# Patient Record
Sex: MALE | Race: WHITE | NOT HISPANIC OR LATINO
[De-identification: names, ages, dates, MRNs, and addresses within clinical notes are randomized per-mention and may not be internally consistent; named-entity substitution may affect disease eponyms.]

---

## 2022-01-25 ENCOUNTER — NON-APPOINTMENT (OUTPATIENT)
Age: 37
End: 2022-01-25

## 2022-01-25 PROBLEM — Z00.00 ENCOUNTER FOR PREVENTIVE HEALTH EXAMINATION: Status: ACTIVE | Noted: 2022-01-25

## 2022-01-27 ENCOUNTER — NON-APPOINTMENT (OUTPATIENT)
Age: 37
End: 2022-01-27

## 2022-01-27 ENCOUNTER — APPOINTMENT (OUTPATIENT)
Dept: UROLOGY | Facility: CLINIC | Age: 37
End: 2022-01-27
Payer: COMMERCIAL

## 2022-01-27 VITALS
HEIGHT: 66 IN | BODY MASS INDEX: 31.82 KG/M2 | WEIGHT: 198 LBS | DIASTOLIC BLOOD PRESSURE: 88 MMHG | SYSTOLIC BLOOD PRESSURE: 136 MMHG | HEART RATE: 76 BPM

## 2022-01-27 DIAGNOSIS — Z82.49 FAMILY HISTORY OF ISCHEMIC HEART DISEASE AND OTHER DISEASES OF THE CIRCULATORY SYSTEM: ICD-10-CM

## 2022-01-27 DIAGNOSIS — Z78.9 OTHER SPECIFIED HEALTH STATUS: ICD-10-CM

## 2022-01-27 DIAGNOSIS — Z87.898 PERSONAL HISTORY OF OTHER SPECIFIED CONDITIONS: ICD-10-CM

## 2022-01-27 DIAGNOSIS — N48.89 OTHER SPECIFIED DISORDERS OF PENIS: ICD-10-CM

## 2022-01-27 DIAGNOSIS — Z83.3 FAMILY HISTORY OF DIABETES MELLITUS: ICD-10-CM

## 2022-01-27 DIAGNOSIS — Z80.43 FAMILY HISTORY OF MALIGNANT NEOPLASM OF TESTIS: ICD-10-CM

## 2022-01-27 DIAGNOSIS — Z80.3 FAMILY HISTORY OF MALIGNANT NEOPLASM OF BREAST: ICD-10-CM

## 2022-01-27 PROCEDURE — 99204 OFFICE O/P NEW MOD 45 MIN: CPT

## 2022-01-27 RX ORDER — CHROMIUM 200 MCG
TABLET ORAL
Refills: 0 | Status: ACTIVE | COMMUNITY

## 2022-01-27 RX ORDER — ACETAMINOPHEN 160 MG/5ML
500 SUSPENSION ORAL
Refills: 0 | Status: ACTIVE | COMMUNITY

## 2022-01-27 RX ORDER — ZINC SULFATE 50(220)MG
CAPSULE ORAL
Refills: 0 | Status: ACTIVE | COMMUNITY

## 2022-01-27 RX ORDER — MULTIVITAMIN
TABLET ORAL
Refills: 0 | Status: ACTIVE | COMMUNITY

## 2022-01-27 NOTE — PHYSICAL EXAM
[General Appearance - Well Developed] : well developed [General Appearance - Well Nourished] : well nourished [Normal Appearance] : normal appearance [Well Groomed] : well groomed [General Appearance - In No Acute Distress] : no acute distress [Heart Rate And Rhythm] : Heart rate and rhythm were normal [Edema] : no peripheral edema [Respiration, Rhythm And Depth] : normal respiratory rhythm and effort [Exaggerated Use Of Accessory Muscles For Inspiration] : no accessory muscle use [Auscultation Breath Sounds / Voice Sounds] : lungs were clear to auscultation bilaterally [Abdomen Soft] : soft [Abdomen Tenderness] : non-tender [Abdomen Hernia] : no hernia was discovered [Costovertebral Angle Tenderness] : no ~M costovertebral angle tenderness [Penis Abnormality] : normal circumcised penis [Epididymis] : the epididymides were normal [Testes Tenderness] : no tenderness of the testes [Testes Mass (___cm)] : there were no testicular masses [Normal Station and Gait] : the gait and station were normal for the patient's age [] : no rash [Oriented To Time, Place, And Person] : oriented to person, place, and time [Affect] : the affect was normal [Mood] : the mood was normal [Not Anxious] : not anxious [Inguinal Lymph Nodes Enlarged Bilaterally] : inguinal [FreeTextEntry1] : Deep tendon reflexes were normal

## 2022-01-27 NOTE — LETTER HEADER
[FreeTextEntry3] : Berto Bui M.D.\par Director Emeritus of Urology\par Director of Male Infertility\par [facility]\par [address] [place] [address], [facility] [address]\[place] [address]

## 2022-01-27 NOTE — HISTORY OF PRESENT ILLNESS
[3] : 3 [Dull] : dull [Gradual] : gradual [___ Day(s) Ago] : [unfilled] day(s) ago [Constant] : constantly [Moderate] : moderate in severity [Improving] : improving [None] : No relieving factors are noted [FreeTextEntry1] : Joshua is a 36-year-old male born March 2, 1985 who has been with his wife Betty for 4 years.  They have a 2-year-old and lately they have been trying for another.  However his wife possibly postpartum possibly other issues really only wants relations on the day that she is ovulating she has not yet conceived in [facility] and they are questioning why.  Joshua has noticed that the sperm when he does ejaculate is a little thicker than it used to be and the force of ejaculation is decreased.  He has had no major medications just various vitamins.  He tends to try and work out that with Covid its been a little difficult.  About 3 to 4 days ago he woke up and at the base of the penis where it meets the pubic bone he has been having some pain.  Is not sexually related as they did not have sex in the time Leading to the pain and he cannot recall any trauma.  The area is tender to the touch and when he gets an erection it bothers him more.  It is gotten better in the last day or so but still there and he is concerned as to what it was, what it is and what caused it.  He would also like to know if there is anything other than more frequent sex that might help them conceive their second child.  [de-identified] : Base of the penis [de-identified] : Touch show anything that puts the penis on tension such as an erection

## 2022-01-27 NOTE — ASSESSMENT
[FreeTextEntry1] : I cannot say why there is a change in ejaculation his testicles feel normal in size and he has a normal hair pattern so his hormones are probably okay but we will check them out.  Also going to get a semen analysis though having sex 1 day a month is really not conducive to a high rate of pregnancy.  The issues that he really needs to address is why did his wife go from normal loving sexual partner to the mother of his child but not his sexual partner.  These are known patterns to happen post pregnancy postpartum and may be borderline postpartum depression she probably needs professional help whether she is willing to accept that or not.  Not having intimacy in marriage takes away a very strong stress relieving agent and invariably causes distance between the partners.\par \par These are 2 separate issues, I did discuss with him that one third get better, one third get worse on Thursday the same.  He does not feel hard enough that I think I need an x-ray to see if calcified, I will start him on Naprosyn watching for stomach upset and colchicine watching for stomach upset, diarrhea and checking his bone marrow with periodic CBCs.  He will come back in a month we will see how he is doing we will see what the semen analysis shows and if his wife is willing to come with him I can discuss the lack of sex with her and him and perhaps convince her to seek help.

## 2022-02-07 ENCOUNTER — NON-APPOINTMENT (OUTPATIENT)
Age: 37
End: 2022-02-07

## 2022-03-31 ENCOUNTER — APPOINTMENT (OUTPATIENT)
Dept: UROLOGY | Facility: CLINIC | Age: 37
End: 2022-03-31
Payer: COMMERCIAL

## 2022-03-31 ENCOUNTER — NON-APPOINTMENT (OUTPATIENT)
Age: 37
End: 2022-03-31

## 2022-03-31 VITALS
BODY MASS INDEX: 33.75 KG/M2 | HEART RATE: 89 BPM | WEIGHT: 210 LBS | HEIGHT: 66 IN | SYSTOLIC BLOOD PRESSURE: 152 MMHG | DIASTOLIC BLOOD PRESSURE: 91 MMHG

## 2022-03-31 DIAGNOSIS — F55.3 ABUSE OF STEROIDS OR HORMONES: ICD-10-CM

## 2022-03-31 DIAGNOSIS — N46.9 MALE INFERTILITY, UNSPECIFIED: ICD-10-CM

## 2022-03-31 PROCEDURE — 99214 OFFICE O/P EST MOD 30 MIN: CPT

## 2022-03-31 RX ORDER — NAPROXEN 500 MG/1
500 TABLET, DELAYED RELEASE ORAL
Qty: 60 | Refills: 0 | Status: COMPLETED | COMMUNITY
Start: 2022-01-27 | End: 2022-03-31

## 2022-03-31 RX ORDER — COLCHICINE 0.6 MG/1
0.6 TABLET ORAL TWICE DAILY
Qty: 60 | Refills: 1 | Status: COMPLETED | COMMUNITY
Start: 2022-01-27 | End: 2022-03-31

## 2022-03-31 NOTE — LETTER HEADER
[FreeTextEntry3] : Berto Bui M.D.\par Director Emeritus of Urology\par Director of Male Infertility\par [facility]\par [address] [place] [address], [facility] [address]\[place] [address]

## 2022-03-31 NOTE — ASSESSMENT
[FreeTextEntry1] : He is using IM testosterone which caused significant elevated testosterone with nearly undetectable FSH/LH.  He understands that if he wishes to have children in the future he has to discontinue IM testosterone as his FSH levels were almost undetectable.  We again discussed with him that the use of testosterone though not necessarily to 3 weeks he tells us he has been using it for more prolonged time in some patients report a permanent irreversible embolization of sperm.  He tells me he is aware he had just gotten so tired that he used it but he is not going to again\par \par He has discontinued now for about 3 weeks and we will retest him in about 3 weeks to see where he lands.  Hopefully he bottoms out by then we can discuss starting Clomid if necessary.\par \par Concerning his semen analysis he has enough for IUI with it is possible that his recent testosterone injection may  lead to decreased sperm count over the next few months.  The other point I emphasized is, like yearly he is at significantly elevated levels which is a risk to his life  level in future fertility I cannot treat him I cannot rely on what he says.  He understands and realizes the error of what he is done and promises he will not do it again.  He knows that once he bottoms out will take some time for me to get lab results for him to get in and then for me to start him back on medication getting him to a safe level.  I will try to never put him to the level he is at now so he may not feel as good as he does now but what he is doing now is taking a trip with a credit card in sooner or later the bill comes to\par \par We will see him back in 5 weeks to review his blood work, hopefully will have resulted by that and he will stay off all testosterone until we see where he lands and we see what we can do.  \par \par If necessary we will consider Clomid when he bottoms out

## 2022-03-31 NOTE — END OF VISIT
[FreeTextEntry3] : I, Dr. Bui, personally performed the evaluation and management (E/M) services for this established patient who presents today with (a) new problem(s)/exacerbation of (an) existing condition(s).  That E/M includes conducting the examination, assessing all new/exacerbated conditions, and establishing a new plan of care.  Today, my ACP, Jameson Goodrich was here to observe my evaluation and management services for this new problem/exacerbated condition to be followed going forward.  [Time Spent: ___ minutes] : I have spent [unfilled] minutes of time on the encounter. [>50% of the face to face encounter time was spent on counseling and/or coordination of care for ___] : Greater than 50% of the face to face encounter time was spent on counseling and/or coordination of care for [unfilled]

## 2022-04-11 ENCOUNTER — APPOINTMENT (OUTPATIENT)
Dept: UROLOGY | Facility: CLINIC | Age: 37
End: 2022-04-11

## 2022-05-13 ENCOUNTER — APPOINTMENT (OUTPATIENT)
Dept: UROLOGY | Facility: CLINIC | Age: 37
End: 2022-05-13

## 2024-06-04 ENCOUNTER — APPOINTMENT (OUTPATIENT)
Dept: PULMONOLOGY | Facility: CLINIC | Age: 39
End: 2024-06-04